# Patient Record
Sex: MALE | Race: WHITE
[De-identification: names, ages, dates, MRNs, and addresses within clinical notes are randomized per-mention and may not be internally consistent; named-entity substitution may affect disease eponyms.]

---

## 2018-11-19 ENCOUNTER — HOSPITAL ENCOUNTER (EMERGENCY)
Dept: HOSPITAL 88 - ER | Age: 38
Discharge: HOME | End: 2018-11-19
Payer: SELF-PAY

## 2018-11-19 VITALS — BODY MASS INDEX: 31.76 KG/M2 | HEIGHT: 64 IN | WEIGHT: 186 LBS

## 2018-11-19 VITALS — DIASTOLIC BLOOD PRESSURE: 97 MMHG | SYSTOLIC BLOOD PRESSURE: 141 MMHG

## 2018-11-19 DIAGNOSIS — E11.9: ICD-10-CM

## 2018-11-19 DIAGNOSIS — R11.2: ICD-10-CM

## 2018-11-19 DIAGNOSIS — F10.20: ICD-10-CM

## 2018-11-19 DIAGNOSIS — R10.13: Primary | ICD-10-CM

## 2018-11-19 DIAGNOSIS — F17.210: ICD-10-CM

## 2018-11-19 DIAGNOSIS — I10: ICD-10-CM

## 2018-11-19 LAB
ALBUMIN SERPL-MCNC: 3.3 G/DL (ref 3.5–5)
ALBUMIN/GLOB SERPL: 0.8 {RATIO} (ref 0.8–2)
ALP SERPL-CCNC: 47 IU/L (ref 40–150)
ALT SERPL-CCNC: 36 IU/L (ref 0–55)
AMYLASE SERPL-CCNC: 52 U/L (ref 25–125)
ANION GAP SERPL CALC-SCNC: 16.8 MMOL/L (ref 8–16)
BACTERIA URNS QL MICRO: (no result) /HPF
BASOPHILS # BLD AUTO: 0 10*3/UL (ref 0–0.1)
BASOPHILS NFR BLD AUTO: 0.5 % (ref 0–1)
BILIRUB UR QL: NEGATIVE
BUN SERPL-MCNC: 13 MG/DL (ref 7–26)
BUN/CREAT SERPL: 15 (ref 6–25)
CALCIUM SERPL-MCNC: 8.7 MG/DL (ref 8.4–10.2)
CHLORIDE SERPL-SCNC: 102 MMOL/L (ref 98–107)
CK MB SERPL-MCNC: 3.7 NG/ML (ref 0–5)
CK MB SERPL-MCNC: 3.9 NG/ML (ref 0–5)
CK SERPL-CCNC: 340 IU/L (ref 30–200)
CK SERPL-CCNC: 451 IU/L (ref 30–200)
CLARITY UR: (no result)
CO2 SERPL-SCNC: 20 MMOL/L (ref 22–29)
COLOR UR: YELLOW
DEPRECATED APTT PLAS QN: 27.5 SECONDS (ref 23.8–35.5)
DEPRECATED INR PLAS: 0.87
DEPRECATED NEUTROPHILS # BLD AUTO: 5.2 10*3/UL (ref 2.1–6.9)
DEPRECATED RBC URNS MANUAL-ACNC: (no result) /HPF (ref 0–5)
EGFRCR SERPLBLD CKD-EPI 2021: > 60 ML/MIN (ref 60–?)
EOSINOPHIL # BLD AUTO: 0 10*3/UL (ref 0–0.4)
EOSINOPHIL NFR BLD AUTO: 0.4 % (ref 0–6)
EPI CELLS URNS QL MICRO: (no result) /LPF
ERYTHROCYTE [DISTWIDTH] IN CORD BLOOD: 12.4 % (ref 11.7–14.4)
GLOBULIN PLAS-MCNC: 4.3 G/DL (ref 2.3–3.5)
GLUCOSE SERPLBLD-MCNC: 69 MG/DL (ref 74–118)
HCT VFR BLD AUTO: 35.8 % (ref 38.2–49.6)
HGB BLD-MCNC: 12.6 G/DL (ref 14–18)
KETONES UR QL STRIP.AUTO: NEGATIVE
LEUKOCYTE ESTERASE UR QL STRIP.AUTO: NEGATIVE
LIPASE SERPL-CCNC: 23 U/L (ref 8–78)
LYMPHOCYTES # BLD: 2.2 10*3/UL (ref 1–3.2)
LYMPHOCYTES NFR BLD AUTO: 27 % (ref 18–39.1)
MAGNESIUM SERPL-MCNC: 2 MG/DL (ref 1.3–2.1)
MCH RBC QN AUTO: 31.2 PG (ref 28–32)
MCHC RBC AUTO-ENTMCNC: 35.2 G/DL (ref 31–35)
MCV RBC AUTO: 88.6 FL (ref 81–99)
MONOCYTES # BLD AUTO: 0.7 10*3/UL (ref 0.2–0.8)
MONOCYTES NFR BLD AUTO: 8.8 % (ref 4.4–11.3)
NEUTS SEG NFR BLD AUTO: 63.1 % (ref 38.7–80)
NITRITE UR QL STRIP.AUTO: NEGATIVE
PLATELET # BLD AUTO: 144 X10E3/UL (ref 140–360)
POTASSIUM SERPL-SCNC: 3.8 MMOL/L (ref 3.5–5.1)
PROT UR QL STRIP.AUTO: (no result)
PROTHROMBIN TIME: 12.6 SECONDS (ref 11.9–14.5)
RBC # BLD AUTO: 4.04 X10E6/UL (ref 4.3–5.7)
SODIUM SERPL-SCNC: 135 MMOL/L (ref 136–145)
SP GR UR STRIP: 1.01 (ref 1.01–1.02)
UROBILINOGEN UR STRIP-MCNC: 0.2 MG/DL (ref 0.2–1)
WBC #/AREA URNS HPF: (no result) /HPF (ref 0–5)

## 2018-11-19 PROCEDURE — 82150 ASSAY OF AMYLASE: CPT

## 2018-11-19 PROCEDURE — 83735 ASSAY OF MAGNESIUM: CPT

## 2018-11-19 PROCEDURE — 83690 ASSAY OF LIPASE: CPT

## 2018-11-19 PROCEDURE — 80053 COMPREHEN METABOLIC PANEL: CPT

## 2018-11-19 PROCEDURE — 85730 THROMBOPLASTIN TIME PARTIAL: CPT

## 2018-11-19 PROCEDURE — 99284 EMERGENCY DEPT VISIT MOD MDM: CPT

## 2018-11-19 PROCEDURE — 84484 ASSAY OF TROPONIN QUANT: CPT

## 2018-11-19 PROCEDURE — 93005 ELECTROCARDIOGRAM TRACING: CPT

## 2018-11-19 PROCEDURE — 82553 CREATINE MB FRACTION: CPT

## 2018-11-19 PROCEDURE — 74177 CT ABD & PELVIS W/CONTRAST: CPT

## 2018-11-19 PROCEDURE — 82550 ASSAY OF CK (CPK): CPT

## 2018-11-19 PROCEDURE — 36415 COLL VENOUS BLD VENIPUNCTURE: CPT

## 2018-11-19 PROCEDURE — 85025 COMPLETE CBC W/AUTO DIFF WBC: CPT

## 2018-11-19 PROCEDURE — 85610 PROTHROMBIN TIME: CPT

## 2018-11-19 PROCEDURE — 81001 URINALYSIS AUTO W/SCOPE: CPT

## 2018-11-19 PROCEDURE — 82948 REAGENT STRIP/BLOOD GLUCOSE: CPT

## 2018-11-19 NOTE — XMS REPORT
Clinical Summary

                             Created on: 2018



Won Tavares

External Reference #: [id number]

: 1980

Sex: Male



Demographics







                          Address                   [address]

[place]  [address]

 

                          Home Phone                [phone]

 

                          Preferred Language        English

 

                          Marital Status            

 

                          Faith Affiliation     Unknown

 

                          Race                      White

 

                          Ethnic Group              Non-





Author







                          Author                    [facility]

 

                          Organization              [facility]

 

                          Address                   Unknown

 

                          Phone                     Unavailable







Support







                Name            Relationship    Address         Phone

 

                Suad Sanchez            Unknown         [phone]







Care Team Providers







                    Care Team Member Name    Role                Phone

 

                    Asked, No Pcp       PCP                 Unavailable







Allergies







                                        Comments



                 Active Allergy     Reactions       Severity        Noted Date 

 

                                        



Childhood allergy



                           Penicillin                2018 







Medications







                          End Date                  Status



              Medication     Sig          Dispensed     Refills      Start  



                                         Date  

 

                                                    Active



                     FLUoxetine (PROzac) 10 MG     Take 10 mg by       0   



                           capsule                   mouth every     



                                         morning.     

 

                                                    Active



                     metoprolol tartrate     Take 50 mg by       0   



                           (LOPRESSOR) 50 mg tablet     mouth 2 (two)     



                                         times a day.     

 

                                                    Active



                     insulin GLARGINE (LANTUS)     Inject 35           0   



                           100 unit/mL injection     Units under     



                           (vial)                    the skin     



                                         nightly.     

 

                                                    Active



                     insulin lispro (HumaLOG)     Inject under        0   



                           100 unit/mL injection     the skin 3     



                                         (three) times     



                                         a day before     



                                         meals.     

 

                                                    Active



                     gabapentin (NEURONTIN)     Take 300 mg         0   



                           300 mg capsule            by mouth 3     



                                         (three) times     



                                         a day.     

 

                                                    Active



                     glimepiride (AMARYL) 4 MG     Take 4 mg by        0   



                           tablet                    mouth daily     



                                         before     



                                         breakfast.     

 

                                                    Active



                     lisinopril          Take 2.5 mg         0   



                           (PRINIVIL,ZESTRIL) 2.5 mg     by mouth     



                           tablet                    daily.     

 

                          2018                Discontinued



                     glimepiride (AMARYL) 2 MG     Take 2 mg by        0   



                           tablet                    mouth daily     



                                         before     



                                         breakfast.     

 

                          2018                Discontinued



                     metoprolol tartrate     Take 25 mg by       0   



                           (LOPRESSOR) 25 mg tablet     mouth 2 (two)     



                                         times a day.     

 

                          2018                Discontinued



                 chlordiazePOXIDE     Take 25 mg by      0               02/15/201  



                     (LIBRIUM) 25 MG capsule     mouth 3             8  



                                         (three) times     



                                         a day as     



                                         needed for     



                                         anxiety or     



                                         withdrawal.     

 

                          2018                Discontinued



                     thiamine 100 MG tablet     Take 100 mg         0   



                                         by mouth     



                                         every     



                                         morning.     

 

                          2018                Discontinued



                     folic acid (FOLVITE) 1 MG     Take 1 mg by        0   



                           tablet                    mouth every     



                                         morning.     

 

                          2018                



              glimepiride (AMARYL) 4 MG     Take 1 tablet     30 tablet     0              



                     tablet              (4 mg total)        8  



                                         by mouth     



                                         daily before     



                                         breakfast for     



                                         30 days.     

 

                          2018                



              atorvastatin (LIPITOR) 40     Take 1 tablet     30 tablet     0              



                     MG tablet           (40 mg total)       8  



                                         by mouth     



                                         nightly for     



                                         30 days.     

 

                          2018                



              metoprolol tartrate     Take 1 tablet     60 tablet     0              



                     (LOPRESSOR) 50 mg tablet     (50 mg total)       8  



                                         by mouth 2     



                                         (two) times a     



                                         day for 30     



                                         days.     

 

                          2018                



              folic acid (FOLVITE) 1 MG     Take 1 tablet     30 tablet     0              



                     tablet              (1 mg total)        8  



                                         by mouth     



                                         every morning     



                                         for 30 days.     

 

                          2018                



              insulin NPH (HumuLIN-N)     Inject 14     10 mL        12             



                     100 unit/mL injection     Units under         8  



                                         the skin 2     



                                         (two) times a     



                                         day for 30     



                                         days.     

 

                          2018                



              pantoprazole (PROTONIX)     Take 1 tablet     30 tablet     0              



                     40 MG EC tablet     (40 mg total)       8  



                                         by mouth     



                                         daily for 30     



                                         days.     

 

                          2018                



              thiamine 100 MG tablet     Take 1 tablet     30 tablet     0              



                           (100 mg                   8  



                                         total) by     



                                         mouth every     



                                         morning for     



                                         30 days.     

 

                          2018                



              pantoprazole (PROTONIX)     Take 1 tablet     30 tablet     0              



                     40 MG EC tablet     (40 mg total)       8  



                                         by mouth     



                                         daily for 30     



                                         days.     

 

                          2018                



              sucralfate (CARAFATE) 1     Take 1 tablet     120 tablet     0              



                     gram tablet         (1 g total)         8  



                                         by mouth 4     



                                         (four) times     



                                         a day for 30     



                                         days.     

 

                          2018                



              ALPRAZolam (XANAX) 0.5 MG     Take 1 tablet     10 tablet     0              



                     tablet              (0.5 mg             8  



                                         total) by     



                                         mouth 2 (two)     



                                         times a day     



                                         as needed for     



                                         anxiety for     



                                         up to 5 days.     

 

                          2018                



              chlordiazePOXIDE     Take 1       12 capsule     0              



                     (LIBRIUM) 5 MG capsule     capsule (5 mg       8  



                                         total) by     



                                         mouth 3     



                                         (three) times     



                                         a day as     



                                         needed for     



                                         anxiety for     



                                         up to 4 days.     

 

                          2018                



              sucralfate (CARAFATE) 100     Take 10 mL (1     1200 mL      0              



                     mg/mL suspension     g total) by         8  



                                         mouth 4     



                                         (four) times     



                                         a day before     



                                         meals and     



                                         nightly for     



                                         30 days.     

 

                          2018                



              folic acid (FOLVITE) 1 MG     Take 1 tablet     30 tablet     0              



                     tablet              (1 mg total)        8  



                                         by mouth     



                                         daily for 30     



                                         days.     

 

                          2018                



              pantoprazole (PROTONIX)     Take 1 tablet     60 tablet     0              



                     40 MG EC tablet     (40 mg total)       8  



                                         by mouth 2     



                                         (two) times a     



                                         day for 30     



                                         days.     

 

                          2018                



              thiamine 100 MG tablet     Take 1 tablet     30 tablet     0              



                           (100 mg                   8  



                                         total) by     



                                         mouth daily     



                                         for 30 days.     







Active Problems







 



                           Problem                   Noted Date

 

 



                           Gastritis                 2018

 

 



                           Esophagitis               2018

 

 



                           Chronic pancreatitis due to acute alcohol intoxication     2018

 

 



                           Epigastric abdominal pain     2018

 

 



                                         Overview:



                                         Added automatically from request for surgery [id number]

 

 



                           Type 2 diabetes mellitus with complication     2018

 

 



                           Thrombocytopenia          2018

 

 



                           B12 deficiency            2018

 

 



                           Anxiety                   2018

 

 



                           ETOH abuse                2018

 

 



                                         Hypertension 







Resolved Problems







  



                     Problem             Noted Date          Resolved Date

 

  



                     Alcohol-induced acute pancreatitis without infection or necrosis     2018







Encounters







                          Care Team                 Description



                     Date                Type                Specialty  

 

                                        



Jessi Coreas MD                 ESOPHAGOGASTRODUODENOSCOPY (EGD) with biopsy



                     2018          Surgery             Gastroenterology  

 

                                        



[facility]                 



                     2018          Anesthesia          Gastroenterology  



                                         [facility], MD Christina Rebolledo, MD Tash Salazar, Gunjan Go MD                       Epigastric abdominal pain (Primary Dx); 

Chronic pancreatitis due to acute alcohol intoxication; 

Intractable vomiting with nausea, unspecified vomiting type; 

Type 2 diabetes mellitus with complication, with long-term current use of 
insulin



                     2018          [facility]            General Internal Medicine  



                           -                         Encounter   



                                         2018    

 

                                        



Gabrielle Shepherd, HENRRY                       



                     2018          Telephone           Diabetes Services  

 

                                        



Rolanda Justin MD Joglekar, Swati, MD Bavare, MD Adiel Salazar David, DO                       Alcohol-induced acute pancreatitis without infection or necrosis

 (Primary Dx); 

Acute alcoholic intoxication with complication



                     2018          [facility]            General Internal Medicine  



                           -                         Encounter   



                                         2018    

 

                                        



Alex Gomez Jr., MD    Alcohol withdrawal syndrome without complication

 (Primary Dx)



                     2018          Emergency           Emergency Medicine  



after 2017



Social History







                                        Date



                 Tobacco Use     Types           Packs/Day       Years Used 

 

                                         



                                         Never Smoker    

 

    



                                         Smokeless Tobacco: Never   



                                         Used   









   



                 Alcohol Use     Drinks/Week     oz/Week         Comments

 

   



                     Yes                 [employer] of          50.4 



                                         beer  









 



                           Sex Assigned at Birth     Date Recorded

 

 



                                         Not on file 









                                        Industry



                           Job Start Date            Occupation 

 

                                        Not on file



                           Not on file               Not on file 









                                        Travel End



                           Travel History            Travel Start 

 





                                         No recent travel history available.







Last Filed Vital Signs







                                        Time Taken



                           Vital Sign                Reading 

 

                                        2018 11:58 AM CDT



                           Blood Pressure            156/95 

 

                                        2018 11:58 AM CDT



                           Pulse                     70 

 

                                        2018  7:38 AM CDT



                           Temperature               36.2 C (97.2 F) 

 

                                        2018 11:58 AM CDT



                           Respiratory Rate          18 

 

                                        2018 11:58 AM CDT



                           Oxygen Saturation         97% 

 

                                        -



                           Inhaled Oxygen            - 



                                         Concentration  

 

                                        2018 10:45 AM CDT



                           Weight                    87.7 kg (193 lb 4 oz) 

 

                                        2018 10:45 AM CDT



                           Height                    165.1 cm (5' 5") 

 

                                        2018 10:45 AM CDT



                           Body Mass Index           32.16 







Plan of Treatment







   



                 Health Maintenance     Due Date        Last Done       Comments

 

   



                           DIABETIC RETINAL EYE EXAM     1980  

 

   



                           MMR VACCINES (1 of  -     12/15/1979  



                                         Standard series)   

 

   



                           DIABETIC FOOT EXAM        12/15/1988  

 

   



                           URINE MICROALBUMIN        12/15/1988  

 

   



                           VARICELLA VACCINES (1 of     12/15/1991  



                                         2 - 2-dose adolescent   



                                         series)   

 

   



                           HEPATITIS B VACCINES (1     12/15/1997  



                                         of 3 - Risk 3-dose   



                                         series)   

 

   



                           INFLUENZA VACCINE         2018  

 

   



                     IPV VACCINES        Aged Out            No longer eligible based



                                         on patient's age to



                                         complete this topic

 

   



                     MENINGOCOCCAL VACCINE     Aged Out            No longer eligible based



                                         on patient's age to



                                         complete this topic







Procedures







                                        Comments



                 Procedure Name     Priority        Date/Time       Associated Diagnosis 

 

                                        



Results for this procedure are in the results section.



                     POC GLUCOSE         Routine             2018  



                                         11:56 AM CDT  

 

                                        



Results for this procedure are in the results section.



                     POC GLUCOSE         Routine             2018  



                                         9:23 AM CDT  

 

                                        



Results for this procedure are in the results section.



                     POC GLUCOSE         Routine             2018  



                                         9:00 AM CDT  

 

                                        



Results for this procedure are in the results section.



                     POC GLUCOSE         Routine             2018  



                                         6:17 AM CDT  

 

                                        



Results for this procedure are in the results section.



                     POC GLUCOSE         Routine             2018  



                                         8:28 PM CDT  

 

                                        



Results for this procedure are in the results section.



                     POC GLUCOSE         Routine             2018  



                                         4:14 PM CDT  

 

                                        



Results for this procedure are in the results section.



                     SURGICAL PATHOLOGY     Routine             2018  



                           REQUEST                   12:57 PM CDT  

 

                                        



Results for this procedure are in the results section.



                     POC GLUCOSE         Routine             2018  



                                         12:24 PM CDT  

 

                                         



                     ESOPHAGOGASTRODUODENOSCOP      2018          Epigastric abdominal pain 



                           Y (EGD)                   11:15 AM CDT  

 

                                        



Results for this procedure are in the results section.



                     POC GLUCOSE         Routine             2018  



                                         6:41 AM CDT  

 

                                        



Results for this procedure are in the results section.



                     HEMOGLOBIN A1C      Routine             2018  



                                         6:30 AM CDT  

 

                                        



Results for this procedure are in the results section.



                     POC GLUCOSE         Routine             2018  



                                         8:14 PM CDT  

 

                                        



Results for this procedure are in the results section.



                     POC GLUCOSE         Routine             2018  



                                         4:24 PM CDT  

 

                                        



Results for this procedure are in the results section.



                     POC GLUCOSE         Routine             2018  



                                         11:01 AM CDT  

 

                                        



Results for this procedure are in the results section.



                     POC GLUCOSE         Routine             2018  



                                         6:19 AM CDT  

 

                                        



Results for this procedure are in the results section.



                     ZZESTIMATED GFR     Routine             2018  



                                         6:15 AM CDT  

 

                                        



Results for this procedure are in the results section.



                     HC COMPLETE BLD COUNT     Routine             2018  



                           W/AUTO DIFF               6:15 AM CDT  

 

                                        



Results for this procedure are in the results section.



                     HEMOGLOBIN A1C      Routine             2018  



                                         6:15 AM CDT  

 

                                        



Results for this procedure are in the results section.



                     LIPID PANEL         Routine             2018  



                                         6:15 AM CDT  

 

                                        



Results for this procedure are in the results section.



                     BASIC METABOLIC PANEL     Routine             2018  



                                         6:15 AM CDT  

 

                                        



Results for this procedure are in the results section.



                     POC GLUCOSE         Routine             2018  



                                         8:00 PM CDT  

 

                                        



Results for this procedure are in the results section.



                     POC GLUCOSE         Routine             2018  



                                         4:20 PM CDT  

 

                                        



Results for this procedure are in the results section.



                     POC GLUCOSE         Routine             2018  



                                         10:09 AM CDT  

 

                                        



Results for this procedure are in the results section.



                     POC GLUCOSE         Routine             2018  



                                         9:45 AM CDT  

 

                                        



Results for this procedure are in the results section.



                     XR CHEST 2 VW       STAT                2018  



                                         7:15 AM CDT  

 

                                        



Results for this procedure are in the results section.



                     CT ABDOMEN PELVIS W     STAT                2018  



                           CONTRAST                  7:05 AM CDT  

 

                                        



Results for this procedure are in the results section.



                     ECG 12-LEAD         STAT                2018  



                                         6:12 AM CDT  

 

                                        



Results for this procedure are in the results section.



                     ZZESTIMATED GFR     STAT                2018  



                                         5:04 AM CDT  

 

                                        



Results for this procedure are in the results section.



                     ALCOHOL LEVEL, BLOOD     STAT                2018  



                                         5:04 AM CDT  

 

                                        



Results for this procedure are in the results section.



                     LIPASE LEVEL        STAT                2018  



                                         5:04 AM CDT  

 

                                        



Results for this procedure are in the results section.



                     COMPREHENSIVE METABOLIC     STAT                2018  



                           PANEL                     5:04 AM CDT  

 

                                        



Results for this procedure are in the results section.



                     HC COMPLETE BLD COUNT     STAT                2018  



                           W/AUTO DIFF               5:04 AM CDT  

 

                                        



Results for this procedure are in the results section.



                     URINE DRUGS OF ABUSE     Routine             2018  



                           SCREEN                    5:02 AM CDT  

 

                                        



Results for this procedure are in the results section.



                     URINALYSIS SCREEN AND     STAT                2018  



                           MICROSCOPY, WITH REFLEX      5:02 AM CDT  



                                         TO CULTURE    

 

                                        



Results for this procedure are in the results section.



                     POC GLUCOSE         Routine             2018  



                                         11:41 AM CDT  

 

                                        



Results for this procedure are in the results section.



                     POC GLUCOSE         Routine             2018  



                                         6:22 AM CDT  

 

                                        



Results for this procedure are in the results section.



                     ZZESTIMATED GFR     Routine             2018  



                                         5:56 AM CDT  

 

                                        



Results for this procedure are in the results section.



                     MAGNESIUM LEVEL     Routine             2018  



                                         5:56 AM CDT  

 

                                        



Results for this procedure are in the results section.



                     HC COMPLETE BLD COUNT     Routine             2018  



                           W/AUTO DIFF               5:56 AM CDT  

 

                                        



Results for this procedure are in the results section.



                     BASIC METABOLIC PANEL     Routine             2018  



                                         5:56 AM CDT  

 

                                        



Results for this procedure are in the results section.



                     POC GLUCOSE         Routine             2018  



                                         8:56 PM CDT  

 

                                        



Results for this procedure are in the results section.



                     POC GLUCOSE         Routine             2018  



                                         4:04 PM CDT  

 

                                        



Results for this procedure are in the results section.



                     POC GLUCOSE         Routine             2018  



                                         12:00 PM CDT  

 

                                        



Results for this procedure are in the results section.



                     POC GLUCOSE         Routine             2018  



                                         6:48 AM CDT  

 

                                        



Results for this procedure are in the results section.



                     LIPASE LEVEL        Routine             2018  



                                         5:34 AM CDT  

 

                                        



Results for this procedure are in the results section.



                     POC GLUCOSE         Routine             2018  



                                         9:23 PM CDT  

 

                                        



Results for this procedure are in the results section.



                     POC GLUCOSE         Routine             2018  



                                         4:15 PM CDT  

 

                                        



Results for this procedure are in the results section.



                     POC GLUCOSE         Routine             2018  



                                         11:22 AM CDT  

 

                                        



Results for this procedure are in the results section.



                     ZZESTIMATED GFR     Routine             2018  



                                         7:04 AM CDT  

 

                                        



Results for this procedure are in the results section.



                     LIPASE LEVEL        Routine             2018  



                                         7:04 AM CDT  

 

                                        



Results for this procedure are in the results section.



                     HC COMPLETE BLD COUNT     Routine             2018  



                           W/AUTO DIFF               7:04 AM CDT  

 

                                        



Results for this procedure are in the results section.



                     BASIC METABOLIC PANEL     Routine             2018  



                                         7:04 AM CDT  

 

                                        



Results for this procedure are in the results section.



                     POC GLUCOSE         Routine             2018  



                                         6:31 AM CDT  

 

                                        



Results for this procedure are in the results section.



                     POC GLUCOSE         Routine             2018  



                                         8:45 PM CDT  

 

                                        



Results for this procedure are in the results section.



                     POC GLUCOSE         Routine             2018  



                                         4:12 PM CDT  

 

                                        



Results for this procedure are in the results section.



                     POC GLUCOSE         Routine             2018  



                                         11:14 AM CDT  

 

                                        



Results for this procedure are in the results section.



                     POC GLUCOSE         Routine             2018  



                                         6:42 AM CDT  

 

                                        



Results for this procedure are in the results section.



                     ZZESTIMATED GFR     Routine             2018  



                                         4:55 AM CDT  

 

                                        



Results for this procedure are in the results section.



                     LIPASE LEVEL        Routine             2018  



                                         4:55 AM CDT  

 

                                        



Results for this procedure are in the results section.



                     LIPID PANEL         Routine             2018  



                                         4:55 AM CDT  

 

                                        



Results for this procedure are in the results section.



                     HEMOGLOBIN A1C      Routine             2018  



                                         4:55 AM CDT  

 

                                        



Results for this procedure are in the results section.



                     COMPREHENSIVE METABOLIC     Routine             2018  



                           PANEL                     4:55 AM CDT  

 

                                        



Results for this procedure are in the results section.



                     HC COMPLETE BLD COUNT     Routine             2018  



                           W/AUTO DIFF               4:55 AM CDT  

 

                                        



Results for this procedure are in the results section.



                     POC GLUCOSE         Routine             2018  



                                         9:01 PM CDT  

 

                                        



Results for this procedure are in the results section.



                     POC GLUCOSE         Routine             2018  



                                         7:46 PM CDT  

 

                                        



Results for this procedure are in the results section.



                     POC GLUCOSE         Routine             2018  



                                         3:40 PM CDT  

 

                                        



Results for this procedure are in the results section.



                     ECHOCARDIOGRAM 2D     Routine             2018  



                           COMPLETE W MMODE SPECTRAL      3:06 PM CDT  



                                         COLOR DOPPLER (82577)    

 

                                        



Results for this procedure are in the results section.



                     CT CHEST WO CONTRAST     STAT                2018  



                                         12:54 PM CDT  

 

                                        



Results for this procedure are in the results section.



                     POC GLUCOSE         Routine             2018  



                                         12:40 PM CDT  

 

                                        



Results for this procedure are in the results section.



                     CT ABDOMEN PELVIS WO     STAT                2018  



                           CONTRAST                  11:37 AM CDT  

 

                                        



Results for this procedure are in the results section.



                     CT HEAD WO CONTRAST     STAT                2018  



                                         11:29 AM CDT  

 

                                        



Results for this procedure are in the results section.



                     URINE DRUGS OF ABUSE     STAT                2018  



                           SCREEN                    6:22 AM CDT  

 

                                        



Results for this procedure are in the results section.



                     URINALYSIS SCREEN AND     STAT                2018  



                           MICROSCOPY, WITH REFLEX      6:22 AM CDT  



                                         TO CULTURE    

 

                                        



Results for this procedure are in the results section.



                     XR CHEST 1 VW PORTABLE     STAT                2018  



                                         5:15 AM CDT  

 

                                        



Results for this procedure are in the results section.



                     ECG 12-LEAD         STAT                2018  



                                         4:52 AM CDT  

 

                                        



Results for this procedure are in the results section.



                     ZZESTIMATED GFR     STAT                2018  



                                         4:48 AM CDT  

 

                                        



Results for this procedure are in the results section.



                     LIPASE LEVEL        STAT                2018  



                                         4:48 AM CDT  

 

                                        



Results for this procedure are in the results section.



                     ALCOHOL LEVEL, BLOOD     STAT                2018  



                                         4:48 AM CDT  

 

                                        



Results for this procedure are in the results section.



                     B NATRIURETIC PEPTIDE     STAT                2018  



                                         4:48 AM CDT  

 

                                        



Results for this procedure are in the results section.



                     TROPONIN            STAT                2018  



                                         4:48 AM CDT  

 

                                        



Results for this procedure are in the results section.



                     COMPREHENSIVE METABOLIC     STAT                2018  



                           PANEL                     4:48 AM CDT  

 

                                        



Results for this procedure are in the results section.



                     PROTHROMBIN TIME WITH INR     STAT                2018  



                                         4:48 AM CDT  

 

                                        



Results for this procedure are in the results section.



                     PARTIAL THROMBOPLASTIN     STAT                2018  



                           TIME (PTT)                4:48 AM CDT  

 

                                        



Results for this procedure are in the results section.



                     HC COMPLETE BLD COUNT     STAT                2018  



                           W/AUTO DIFF               4:48 AM CDT  

 

                                        



Results for this procedure are in the results section.



                     ECG ED PRELIMINARY     Routine             2018  



                           INTERPRETATION            4:46 AM CDT  

 

                                        



Results for this procedure are in the results section.



                     TROPONIN            Timed               2018  



                                         2:59 PM CST  

 

                                        



Results for this procedure are in the results section.



                     CT ABDOMEN PELVIS W     STAT                2018  



                           CONTRAST                  1:00 PM CST  

 

                                        



Results for this procedure are in the results section.



                     ECG ED PRELIMINARY     Routine             2018  



                           INTERPRETATION            11:46 AM CST  

 

                                        



Results for this procedure are in the results section.



                     URINALYSIS SCREEN AND     STAT                2018  



                           MICROSCOPY, WITH REFLEX      11:05 AM CST  



                                         TO CULTURE    

 

                                        



Results for this procedure are in the results section.



                     LACTIC ACID LEVEL     STAT                2018  



                                         10:29 AM CST  

 

                                        



Results for this procedure are in the results section.



                     ALCOHOL LEVEL, BLOOD     STAT                2018  



                                         10:29 AM CST  

 

                                        



Results for this procedure are in the results section.



                     PHOSPHORUS LEVEL     STAT                2018  



                                         10:29 AM CST  

 

                                        



Results for this procedure are in the results section.



                     MAGNESIUM LEVEL     STAT                2018  



                                         10:29 AM CST  

 

                                        



Results for this procedure are in the results section.



                     TROPONIN            STAT                2018  



                                         10:29 AM CST  

 

                                        



Results for this procedure are in the results section.



                     ZZESTIMATED GFR     STAT                2018  



                                         10:29 AM CST  

 

                                        



Results for this procedure are in the results section.



                     HC COMPLETE BLD COUNT     STAT                2018  



                           W/AUTO DIFF               10:29 AM CST  

 

                                        



Results for this procedure are in the results section.



                     LIPASE LEVEL        STAT                2018  



                                         10:29 AM CST  

 

                                        



Results for this procedure are in the results section.



                     COMPREHENSIVE METABOLIC     STAT                2018  



                           PANEL                     10:29 AM CST  

 

                                        



Results for this procedure are in the results section.



                     ECG 12-LEAD         STAT                2018  



                                         10:24 AM CST  



after 2017



Results

* POC glucose (2018 11:56 AM CDT)



Only the most recent of 34 results within the time period is included.





   



                 Component       Value           Ref Range       Performed At

 

   



                 POC glucose     201 (H)         65 - 100 mg/dL     [facility] [place] DEPARTMENT OF



                           Comment:                  PATHOLOGY AND



                           Meter ID: [id number]      GENOMIC MEDICINE



                                         : Paola Liriano  









   



                 Performing Organization     Address         City/[facility]/Zipcode     Phone Number

 

   



                     [facility]     [address] [place], [place] [id number] 



                                         PATHOLOGY AND GENOMIC   



                                         MEDICINE   





* Surgical pathology request (2018 12:57 PM CDT)





   



                 Component       Value           Ref Range       Performed At

 

   



                     Case number         [id number]        [facility] [place] DEPARTMENT OF



                                         PATHOLOGY AND



                                         GENOMIC MEDICINE

 

   



                     Surgical pathology report     See link below for PDF Lab      [facility] [place] DEPARTMENT OF



                           Report                    PATHOLOGY AND



                                         GENOMIC MEDICINE

 

   



                     Result status       This is Final Report to      [facility] [place] DEPARTMENT OF



                           [id number]             PATHOLOGY AND



                                         GENOMIC MEDICINE









   



                 Performing Organization     Address         City/[facility]     Phone Number

 

   



                     [place] [phone] 



                                         PATHOLOGY AND GENOMIC   



                                         MEDICINE   





* Hemoglobin A1c (2018  6:30 AM CDT)



Only the most recent of 3 results within the time period is included.





   



                 Component       Value           Ref Range       Performed At

 

   



                 Hemoglobin A1C     7.9 (H)         4.0 - 6.0 %     [facility] [place] DEPARTMENT OF



                           Comment:                  PATHOLOGY AND



                           ******************************      GENOMIC MEDICINE



                                         ***********************  



                                         Less than 6% -  



                                         Goal of therapy for Type II  



                                         Diabetes  



                                         Less than  



                                         7%-Goal of  



                                         therapy for Type I Diabetes  



                                         Less than  



                                         8%-Accepta  



                                         ble control for Type I or Type

  



                                           



                                         II Diabetes  



                                         Greater than  



                                         8%-Unacceptabl  



                                         e control; action indicated.  



                                           



                                         (ADA94)  













                                         Specimen

 





                                         Blood









   



                 Performing Organization     Address         City/[facility]     Phone Number

 

   



                     [place] [phone] 



                                         PATHOLOGY AND GENOMIC   



                                         MEDICINE   





* Estimated GFR (2018  6:15 AM CDT)



Only the most recent of 7 results within the time period is included.





   



                 Component       Value           Ref Range       Performed At

 

   



                 GFR Non Af Amer     >90             mL/min/1.73 m2     [facility] [place] DEPARTMENT OF



                                         PATHOLOGY AND



                                         GENOMIC MEDICINE

 

   



                 GFR Af Amer     >90             mL/min/1.73 m2     [facility] [place] DEPARTMENT OF



                           Comment:                  PATHOLOGY AND



                           Chronic kidney disease: <60      GENOMIC MEDICINE



                                         mL/min/1.73m2  



                                         Kidney failure: <15  



                                         mL/min/1.73m2  



                                         The estimated GFR is  



                                         calculated from the  



                                         IDMS-traceable Modification of  



                                         Diet  



                                         in Renal Disease Equation. The  



                                         accuracy of the calculation is  



                                         poor when the  



                                         creatinine is normal.  



                                         Calculated values >90  



                                         mL/min/1.73m2 are not  



                                         reported.  



                                         This equation has not been  



                                         validated in children (<18  



                                         years), pregnant  



                                         women, the elderly (>70  



                                         years), or ethnic groups other  



                                         than Caucasians and  



                                          Americans.  













                                         Specimen

 





                                         Plasma specimen









   



                 Performing Organization     Address         City/State/Zipcode     Phone Number

 

   



                     [place] [id number] [place] [address] 



                                         PATHOLOGY AND GENOMIC   



                                         MEDICINE   





* CBC with platelet and differential (2018  6:15 AM CDT)



Only the most recent of 7 results within the time period is included.





   



                 Component       Value           Ref Range       Performed At

 

   



                 WBC             5.2             4.2 - 11.0 k/uL     [facility]



                                         PATHOLOGY AND



                                         GENOMIC MEDICINE

 

   



                 RBC             4.06            4.04 - 5.86 m/uL     [facility]



                                         PATHOLOGY AND



                                         GENOMIC MEDICINE

 

   



                 HGB             12.8 (L)        13.0 - 17.3 g/dL     [facility]



                                         PATHOLOGY AND



                                         GENOMIC MEDICINE

 

   



                 HCT             37.8            34.0 - 45.0 %     [facility]



                                         PATHOLOGY AND



                                         GENOMIC MEDICINE

 

   



                 MCV             93.1            80.0 - 98.0 fL     [facility]



                                         PATHOLOGY AND



                                         GENOMIC MEDICINE

 

   



                 MCH             31.5            27.0 - 34.0 pg     [facility]



                                         PATHOLOGY AND



                                         GENOMIC MEDICINE

 

   



                 MCHC            33.9            31.5 - 36.5 g/dL     [facility]



                                         PATHOLOGY AND



                                         GENOMIC MEDICINE

 

   



                 RDW - SD        44.8            37.0 - 51.0 fL     [facility]



                                         PATHOLOGY AND



                                         GENOMIC MEDICINE

 

   



                 MPV             10.5 (H)        7.4 - 10.4 fL     [facility]



                                         PATHOLOGY AND



                                         GENOMIC MEDICINE

 

   



                 Platelet count     135 (L)         150 - 400 k/uL     [facility] DEPARTMENT OF



                                         PATHOLOGY AND



                                         GENOMIC MEDICINE

 

   



                 Nucleated RBC     0.00            /100 WBC        [facility] DEPARTMENT OF



                                         PATHOLOGY AND



                                         GENOMIC MEDICINE

 

   



                 Neutrophils     59.0            36.0 - 66.0 %     [facility] DEPARTMENT OF



                                         PATHOLOGY AND



                                         GENOMIC MEDICINE

 

   



                 Lymphocytes     25.7            24.0 - 44.0 %     [facility]



                                         PATHOLOGY AND



                                         GENOMIC MEDICINE

 

   



                 Monocytes       11.6 (H)        0.0 - 6.0 %     [facility]



                                         PATHOLOGY AND



                                         GENOMIC MEDICINE

 

   



                 Eosinophils     2.3             0.0 - 6.0 %     [facility]



                                         PATHOLOGY AND



                                         GENOMIC MEDICINE

 

   



                 Basophils       1.0             0.0 - 1.2 %     [facility]



                                         PATHOLOGY AND



                                         GENOMIC MEDICINE

 

   



                 Immature granulocytes     0.4             0.0 - 1.0 %     [facility] DEPARTMENT OF



                                         PATHOLOGY AND



                                         GENOMIC MEDICINE













                                         Specimen

 





                                         Blood









   



                 Performing Organization     Address         City/[facility]     Phone Number

 

   



                     [facility]     [address] [place] [address] 



                                         PATHOLOGY AND GENOMIC   



                                         MEDICINE   





* Lipid panel (2018  6:15 AM CDT)



Only the most recent of 2 results within the time period is included.





   



                 Component       Value           Ref Range       Performed At

 

   



                 Cholesterol     216 (H)         0 - 199 mg/dL     [facility] DEPARTMENT OF



                                         PATHOLOGY AND



                                         GENOMIC MEDICINE

 

   



                 Triglycerides     66              0 - 149 mg/dL     [facility] DEPARTMENT OF



                                         PATHOLOGY AND



                                         GENOMIC MEDICINE

 

   



                 HDL cholesterol     117             40 - 9,999 mg/dL     [facility] DEPARTMENT OF



                                         PATHOLOGY AND



                                         GENOMIC MEDICINE

 

   



                 LDL cholesterol     102 (H)Comment: Result     0 - 99 mg/dL     [facility]



                           obtained by direct LDL      PATHOLOGY AND



                           measurement               GENOMIC MEDICINE

 

   



                     Lipid panel         See below           [facility] DEPARTMENT OF



                     interpretation      Comment:            PATHOLOGY AND



                           Total Cholesterol         GENOMIC MEDICINE



                                         (mg/dL)  



                                          LDL Cholesterol (mg/dL)  



                                         <200  



                                         Desirable  



                                         <100  



                                          Optimal  



                                         200-239Borderline  



                                         [id number]  



                                         29Near or above  



                                         optimal  



                                         >=240High  



                                           



                                           



                                         130-159Borderline-  



                                         high  



                                           



                                           



                                           



                                         160-189High  



                                           



                                           



                                           



                                         >=190Very high  



                                         HDL Cholesterol  



                                         (mg/dL)  



                                          Triglycerides (mg/dL)  



                                         <40Low  



                                           



                                         <150  



                                          Normal  



                                         >=60  



                                         High  



                                           



                                         150-199Borderline-  



                                         high  



                                           



                                           



                                           



                                         200-499High  



                                           



                                           



                                           



                                         >=500Very high

  



                                         Risk Catergories that modify  



                                         LDL goals.  



                                         Risk  



                                         Catergories  



                                         LDL  



                                         goal (mg/dL)  



                                         CHD and CHD risk  



                                         equivalent  



                                         <100  



                                         (10-year risk >20%)  



                                         Multiple (2+) risk  



                                         factors  



                                          <130  



                                         (10-year risk=<20%)  



                                         0-1 risk  



                                         factors  



                                          <160  



                                         (<10-year  



                                         risk)  



                                           



                                         Defining levels of lipids in  



                                         metabolic syndrome  



                                         Triglycerides  



                                           



                                         >=150 mg/dL  



                                         HDL  



                                         Cholesterol  



                                           



                                         Men  



                                           



                                         <40 mg/dL  



                                         Women  



                                           



                                         <50 mg/dL  



                                         Non-HDL cholesterol is a  



                                         second target for therapy in  



                                         persons  



                                         with high triglycerides (>=200  



                                         mg/dL)  



                                           













                                         Specimen

 





                                         Plasma specimen









   



                 Performing Organization     Address         City/[facility]/Zipcode     Phone Number

 

   



                     [place] [address] [place] [address] 



                                         PATHOLOGY AND GENOMIC   



                                         MEDICINE   





* Basic metabolic panel (2018  6:15 AM CDT)



Only the most recent of 3 results within the time period is included.





   



                 Component       Value           Ref Range       Performed At

 

   



                 Sodium          139             135 - 150 mEq/L     [facility] DEPARTMENT OF



                                         PATHOLOGY AND



                                         GENOMIC MEDICINE

 

   



                 Potassium       3.7             3.5 - 5.0 mEq/L     [facility] DEPARTMENT OF



                                         PATHOLOGY AND



                                         GENOMIC MEDICINE

 

   



                 Chloride        102             98 - 112 mEq/L     [facility] DEPARTMENT OF



                                         PATHOLOGY AND



                                         GENOMIC MEDICINE

 

   



                 CO2             29              24 - 31 mmol/L     [facility] DEPARTMENT OF



                                         PATHOLOGY AND



                                         GENOMIC MEDICINE

 

   



                 Anion gap       8@ANIO          7 - 15 mEq/L     [facility] DEPARTMENT OF



                                         PATHOLOGY AND



                                         GENOMIC MEDICINE

 

   



                 BUN             9               7 - 18 mg/dL     [facility] DEPARTMENT OF



                                         PATHOLOGY AND



                                         GENOMIC MEDICINE

 

   



                 Creatinine      0.70            0.70 - 1.20 mg/dL     [facility] DEPARTMENT OF



                                         PATHOLOGY AND



                                         GENOMIC MEDICINE

 

   



                 Glucose         71              65 - 100 mg/dL     [facility] DEPARTMENT OF



                                         PATHOLOGY AND



                                         GENOMIC MEDICINE

 

   



                 Calcium         8.5             8.3 - 10.2 mg/dL     [facility] DEPARTMENT OF



                                         PATHOLOGY AND



                                         GENOMIC MEDICINE













                                         Specimen

 





                                         Plasma specimen









   



                 Performing Organization     Address         City/State/Zipcode     Phone Number

 

   



                     [facility] DEPARTMENT      [address] [place] [address] 



                                         PATHOLOGY AND GENOMIC   



                                         MEDICINE   





* XR Chest 2 Vw (2018  7:15 AM CDT)





 



                           Narrative                 Performed At

 

 



                           EXAMINATION:XR CHEST 2 VW      [facility]



                                         CLINICAL HISTORY:Admission 



                                         COMPARISON:2018 chest 



                                         IMPRESSION: 



                                         No acute abnormality chest 



                                         The lungs are hypoexpanded but clear. 



                                         The heart is not enlarged. 



                                         The bony structures are within normal limits. 



                                         Two-view chest 



                                         [id number] 









                                        Procedure Note

 

                                        



Hm Interface, Radiology Results Incoming - 2018  7:21 AM CDT



EXAMINATION:  XR CHEST 2 VW



CLINICAL HISTORY:  Admission



COMPARISON:  2018 chest



IMPRESSION:



No acute abnormality chest



The lungs are hypoexpanded but clear.



The heart is not enlarged.



The bony structures are within normal limits. 



Two-view chest



[id number]











   



                 Performing Organization     Address         City/[facility]ode     Phone Number

 

   



                      [facility]          [address] [place] [address] 





* CT Abdomen Pelvis W Contrast (2018  7:05 AM CDT)



Only the most recent of 2 results within the time period is included.





 



                           Narrative                 Performed At

 

 



                           EXAMINATION:CT ABDOMEN PELVIS W CONTRAST      [facility]



                                         CLINICAL HISTORY: 37 yearsMale Nauseavomiting 



                                         TECHNIQUE: Multiple axial images of the abdomen and pelvis were obtained 



                                         following intravenous administration of iodinated contrast. Sagittal and coronal

 



                                         computerized reformatted images were also obtained. CT imaging was performed 



                                         with iterative 



                                         reconstruction techniques and/or automated exposure control to reduce radiation

 



                                         dose. 



                                         COMPARISON:2018 



                                         IMPRESSION: 



                                         Abdomen: 



                                         1. The liver is mildly decreased in attenuation. The spleen, pancreas, 



                                         gallbladder and biliary tree, adrenal glands, and kidneys are normal. The lung 





                                         bases are free of acute disease. 



                                         2.There is diffuse thickening of the visualized lower esophagus. Lymph nodes

 



                                         are present in the gastrohepatic and supraceliac region. 



                                         3.The abdominal aorta is normal in caliber. There our tiny nonspecific 



                                         retroperitoneal lymph nodes. There is no evidence of retroperitoneal mass or 



                                         adenopathy. 



                                         4.The appendix is well-visualized and unremarkable. There is no evidence of

 



                                         intestinal obstruction or free intraperitoneal air. 



                                         Pelvis: 



                                         1. The urinary bladder is slightly distended. There is no evidence of pelvic

 



                                         mass, fluid collection, or pelvic lymphadenopathy. 



                                         2.There are no suspicious bony abnormalities. Hypertrophic changes are 



                                         present in the lower thoracic spine. 



                                         SUMMARY: 



                                         1.Severe diffuse thickening lower esophagus with small paraesophageal and 



                                         gastrohepatic lymph nodes. The findings are most likely related to esophagitis.

 



                                         Endoscopy is recommended for further evaluation. 



                                         2.Mild hepatic steatosis. 



                                         [facility]-[id number] 









                                        Procedure Note

 

                                        



 Interface, Radiology Results Incoming - 2018  7:23 AM CDT



EXAMINATION:  CT ABDOMEN PELVIS W CONTRAST



CLINICAL HISTORY: 37 yearsMale Nausea  vomiting



TECHNIQUE: Multiple axial images of the abdomen and pelvis were obtained 
following intravenous administration of iodinated contrast. Sagittal and coronal
computerized reformatted images were also obtained. CT imaging was performed 
with iterative 

reconstruction techniques and/or automated exposure control to reduce radiation 
dose. 



COMPARISON:  2018



IMPRESSION:



Abdomen:

                                        1.   The liver is mildly decreased in attenuation. The spleen, pancreas, gallbladder

 and biliary tree, adrenal glands, and kidneys are normal. The lung bases are 
free of acute disease.

                                        2.  There is diffuse thickening of the visualized lower esophagus. Lymph nodes are

 present in the gastrohepatic and supraceliac region.

                                        3.  The abdominal aorta is normal in caliber. There our tiny nonspecific retroperitoneal

 lymph nodes. There is no evidence of retroperitoneal mass or adenopathy.

                                        4.  The appendix is well-visualized and unremarkable. There is no evidence of intestinal

 obstruction or free intraperitoneal air.



Pelvis:

                                        1.   The urinary bladder is slightly distended. There is no evidence of pelvic mass,

 fluid collection, or pelvic lymphadenopathy.

                                        2.  There are no suspicious bony abnormalities. Hypertrophic changes are present

 in the lower thoracic spine.



SUMMARY:



                                        1.  Severe diffuse thickening lower esophagus with small paraesophageal and gastrohepatic

 lymph nodes. The findings are most likely related to esophagitis. Endoscopy is 
recommended for further evaluation.

                                        2.  Mild hepatic steatosis.











[facility]-[id number]











   



                 Performing Organization     Address         City/[facility]/Zipcode     Phone Number

 

   



                      [facility]          [address] [place] [address] 





* ECG 12 lead (2018  6:12 AM CDT)



Only the most recent of 3 results within the time period is included.





   



                 Component       Value           Ref Range       Performed At

 

   



                     Ventricular rate     84                  [facility]

 

   



                     Atrial rate         84                  [facility]

 

   



                     NE interval         174                 [facility]

 

   



                     QRSD interval       92                  [facility]

 

   



                     QT interval         380                 [facility]

 

   



                     QTC interval        449                 [facility]

 

   



                     P axis 1            49                  [facility]

 

   



                     QRS axis 1          48                  [facility]

 

   



                     T wave axis         58                  [facility]

 

   



                     EKG impression      Normal sinus rhythm-Normal      [facility]



                                         ECG-In automated comparison  



                                         with ECG of 2018  



                                         04:52,-No significant change  



                                         was found-Electronically  



                                         Signed By Frandy Robledo MD ([id number]) on  



                                         2018 7:01:05 PM  









   



                 Performing Organization     Address         City/[facility]ode     Phone Number

 

   



                     [facility]            [address] [place] [address] 





* Lipase level (2018  5:04 AM CDT)



Only the most recent of 6 results within the time period is included.





   



                 Component       Value           Ref Range       Performed At

 

   



                 Lipase          56              13 - 60 U/L     [facility] DEPARTMENT OF



                                         PATHOLOGY AND



                                         GENOMIC MEDICINE













                                         Specimen

 





                                         Plasma specimen









   



                 Performing Organization     Address         City/[facility] Cent[facility]ode     Phone Number

 

   



                     [address] [place] [address] 



                                         PATHOLOGY AND GENOMIC   



                                         MEDICINE   





* Alcohol level, blood (2018  5:04 AM CDT)



Only the most recent of 3 results within the time period is included.





   



                 Component       Value           Ref Range       Performed At

 

   



                 Alcohol         189.3           mg/dL           [facility] DEPARTMENT OF



                           Comment:                  PATHOLOGY AND



                           Normal      GENOMIC MEDICINE



                                         None  



                                         Detected  



                                         Legal Intoxication in  



                                         [place] 80 mg/dL (0.08%)  



                                         Toxic  



                                         Concentration  



                                          200 mg/dL (0.2%)  



                                         Potentially  



                                         Fatal  



                                         350-500 mg/dL (0.35%-0.5%)  

 

   



                 Alcohol percent     0.189           %               [facility] DEPARTMENT OF



                                         PATHOLOGY AND



                                         GENOMIC MEDICINE













                                         Specimen

 





                                         Blood









   



                 Performing Organization     Address         City/[facility]     Phone Number

 

   



                     [address] [place] [address] 



                                         PATHOLOGY AND GENOMIC   



                                         MEDICINE   





* Comprehensive metabolic panel (2018  5:04 AM CDT)



Only the most recent of 4 results within the time period is included.





   



                 Component       Value           Ref Range       Performed At

 

   



                 Sodium          142             135 - 150 mEq/L     [facility] DEPARTMENT OF



                                         PATHOLOGY AND



                                         GENOMIC MEDICINE

 

   



                 Potassium       4.0             3.5 - 5.0 mEq/L     [facility] DEPARTMENT OF



                                         PATHOLOGY AND



                                         GENOMIC MEDICINE

 

   



                 Chloride        106             98 - 112 mEq/L     [facility] DEPARTMENT OF



                                         PATHOLOGY AND



                                         GENOMIC MEDICINE

 

   



                 CO2             25              24 - 31 mmol/L     [facility] DEPARTMENT OF



                                         PATHOLOGY AND



                                         GENOMIC MEDICINE

 

   



                 Anion gap       11@ANIO         7 - 15 mEq/L     [facility] DEPARTMENT OF



                                         PATHOLOGY AND



                                         GENOMIC MEDICINE

 

   



                 BUN             13              7 - 18 mg/dL     [facility] DEPARTMENT OF



                                         PATHOLOGY AND



                                         GENOMIC MEDICINE

 

   



                 Creatinine      0.60 (L)        0.70 - 1.20 mg/dL     [facility] DEPARTMENT OF



                                         PATHOLOGY AND



                                         GENOMIC MEDICINE

 

   



                 Glucose         95              65 - 100 mg/dL     [facility] DEPARTMENT OF



                                         PATHOLOGY AND



                                         GENOMIC MEDICINE

 

   



                 Calcium         8.3             8.3 - 10.2 mg/dL     [facility] DEPARTMENT OF



                                         PATHOLOGY AND



                                         GENOMIC MEDICINE

 

   



                 Protein         7.2             6.3 - 8.3 g/dL     [facility] DEPARTMENT OF



                                         PATHOLOGY AND



                                         GENOMIC MEDICINE

 

   



                 Albumin         3.1 (L)         3.5 - 5.0 g/dL     [facility] DEPARTMENT OF



                                         PATHOLOGY AND



                                         GENOMIC MEDICINE

 

   



                 A/G ratio       0.8             0.7 - 3.8       [facility] DEPARTMENT OF



                                         PATHOLOGY AND



                                         GENOMIC MEDICINE

 

   



                 Alkaline phosphatase     52              0 - 129 U/L     [facility] DEPARTMENT OF



                                         PATHOLOGY AND



                                         GENOMIC MEDICINE

 

   



                 AST             39              10 - 50 U/L     [facility] DEPARTMENT OF



                                         PATHOLOGY AND



                                         GENOMIC MEDICINE

 

   



                 ALT             43              5 - 50 U/L      [facility] DEPARTMENT OF



                                         PATHOLOGY AND



                                         GENOMIC MEDICINE

 

   



                 Total bilirubin     0.3             0.2 - 1.2 mg/dL     [facility] DEPARTMENT OF



                                         PATHOLOGY AND



                                         GENOMIC MEDICINE













                                         Specimen

 





                                         Plasma specimen









   



                 Performing Organization     Address         City/State/Zipcode     Phone Number

 

   



                     [place] [address] [place] [address] 



                                         PATHOLOGY AND GENOMIC   



                                         MEDICINE   





* Urinalysis screen and microscopy, with reflex to culture (2018  5:02 AM 
  CDT)



Only the most recent of 3 results within the time period is included.





   



                 Component       Value           Ref Range       Performed At

 

   



                     Specimen site       Clean catch         [facility] DEPARTMENT OF



                                         PATHOLOGY AND



                                         GENOMIC MEDICINE

 

   



                     Color, UA           Yellow              [facility] DEPARTMENT OF



                                         PATHOLOGY AND



                                         GENOMIC MEDICINE

 

   



                     Appearance, UA      Clear               [facility] DEPARTMENT OF



                                         PATHOLOGY AND



                                         GENOMIC MEDICINE

 

   



                 Specific gravity, UA     1.011           1.001 - 1.035     [facility] DEPARTMENT OF



                                         PATHOLOGY AND



                                         GENOMIC MEDICINE

 

   



                 pH, UA          5.0             5.0 - 8.5       [facility] DEPARTMENT OF



                                         PATHOLOGY AND



                                         GENOMIC MEDICINE

 

   



                 Protein, UA     2+ (A)          Negative        [facility] DEPARTMENT OF



                                         PATHOLOGY AND



                                         GENOMIC MEDICINE

 

   



                 Glucose, UA     Negative        Negative        [facility] DEPARTMENT OF



                                         PATHOLOGY AND



                                         GENOMIC MEDICINE

 

   



                 Ketones, UA     Negative        Negative        [facility] DEPARTMENT OF



                                         PATHOLOGY AND



                                         GENOMIC MEDICINE

 

   



                 Bilirubin, UA     Negative        Negative        [facility] DEPARTMENT OF



                                         PATHOLOGY AND



                                         GENOMIC MEDICINE

 

   



                 Blood, UA       Moderate (A)     Negative        [facility] DEPARTMENT OF



                                         PATHOLOGY AND



                                         GENOMIC MEDICINE

 

   



                 Nitrite, UA     Negative        Negative        [facility] DEPARTMENT OF



                                         PATHOLOGY AND



                                         GENOMIC MEDICINE

 

   



                 Urobilinogen, UA     Negative        <2.0            [facility] DEPARTMENT OF



                                         PATHOLOGY AND



                                         GENOMIC MEDICINE

 

   



                 Leukocyte esterase, UA     Negative        Negative        [facility] DEPARTMENT OF



                                         PATHOLOGY AND



                                         GENOMIC MEDICINE

 

   



                 Epithelial cells, UA     Few             /HPF            [facility] DEPARTMENT OF



                                         PATHOLOGY AND



                                         GENOMIC MEDICINE

 

   



                 WBC, UA         <1              0 - 1 /HPF      [facility] DEPARTMENT OF



                                         PATHOLOGY AND



                                         GENOMIC MEDICINE

 

   



                 RBC, UA         2               0 - 5 /HPF      [facility] DEPARTMENT OF



                                         PATHOLOGY AND



                                         GENOMIC MEDICINE

 

   



                 Bacteria, UA     Trace           None seen       [facility] DEPARTMENT OF



                                         PATHOLOGY AND



                                         GENOMIC MEDICINE

 

   



                     Yeast, UA           None seen           [facility] DEPARTMENT OF



                                         PATHOLOGY AND



                                         GENOMIC MEDICINE

 

   



                     Yeast with pseudohyphae,     None seen           [facility] DEPARTMENT OF



                           UA                        PATHOLOGY AND



                                         GENOMIC MEDICINE

 

   



                 Granular casts, UA     5 (H)           0 - 1 /LPF      [facility] DEPARTMENT OF



                                         PATHOLOGY AND



                                         GENOMIC MEDICINE

 

   



                 Hyaline casts, UA     1               /LPF            [facility] DEPARTMENT OF



                                         PATHOLOGY AND



                                         GENOMIC MEDICINE













                                         Specimen

 





                                         Urine









   



                 Performing Organization     Address         City/State[facility]     Phone Number

 

   



                     [facility]     [address] [place] [address] 



                                         PATHOLOGY AND GENOMIC   



                                         MEDICINE   





* Urine drugs of abuse screen (2018  5:02 AM CDT)



Only the most recent of 2 results within the time period is included.





   



                 Component       Value           Ref Range       Performed At

 

   



                     Amphetamine screen, urine     Negative            [facility] DEPARTMENT OF



                                         PATHOLOGY AND



                                         GENOMIC MEDICINE

 

   



                     Barbiturate screen, urine     Negative            [facility] DEPARTMENT OF



                                         PATHOLOGY AND



                                         GENOMIC MEDICINE

 

   



                     Benzodiazepine screen,     Negative            [facility] DEPARTMENT OF



                           urine                     PATHOLOGY AND



                                         GENOMIC MEDICINE

 

   



                     Cocaine screen, urine     Negative            [facility] DEPARTMENT OF



                                         PATHOLOGY AND



                                         GENOMIC MEDICINE

 

   



                     Methadone screen, urine     Negative            [facility] DEPARTMENT OF



                                         PATHOLOGY AND



                                         GENOMIC MEDICINE

 

   



                     Opiates screen, urine     Negative            [facility] DEPARTMENT OF



                                         PATHOLOGY AND



                                         GENOMIC MEDICINE

 

   



                     Phencyclidine screen,     Negative            [facility] DEPARTMENT OF



                           urine                     PATHOLOGY AND



                                         GENOMIC MEDICINE

 

   



                     Cannabinoid screen, urine     Negative            [facility] DEPARTMENT OF



                           Comment:                  PATHOLOGY AND



                           Drug screen minimum       GENOMIC MEDICINE



                                         concentration of detectability  



                                         Amphetamines  



                                         1000 ng/mL  



                                         Methamphetamines  



                                         1000 ng/mL  



                                         Barbiturates  



                                          300 ng/mL  



                                         Benzodiazepines  



                                         300 ng/mL  



                                         Cocaine  



                                         300 ng/mL  



                                         Methadone  



                                         300 ng/mL  



                                         Opiates  



                                         300 ng/mL  



                                         Phencyclidine  



                                          25 ng/mL  



                                         Cannabinoids  



                                         50 ng/mL  



                                         Tricyclics  



                                         1000 ng/mL  



                                         Negative test results  



                                         indicates presumptive evidence  



                                         of lack  



                                         of clinically significant drug  



                                         concentration in this urine  



                                         specimen.  



                                         Positive test results are  



                                         presumptive evidence of  



                                         clinically  



                                         significant drug concentration  



                                         in this urine specimen.  



                                         Testing performed for medical  



                                         purposes only.  













                                         Specimen

 





                                         Urine









   



                 Performing Organization     Address         City/[facility]/Zipcode     Phone Number

 

   



                     [facility]     [address] [place] [address] 



                                         PATHOLOGY AND [facility]   



                                         MEDICINE   





* Magnesium level (2018  5:56 AM CDT)



Only the most recent of 2 results within the time period is included.





   



                 Component       Value           Ref Range       Performed At

 

   



                 Magnesium       2.10            1.60 - 2.40 mg/dL     [facility] DEPARTMENT OF



                                         PATHOLOGY AND



                                         [facility] MEDICINE













                                         Specimen

 





                                         Plasma specimen









   



                 Performing Organization     Address         City/[facility]     Phone Number

 

   



                     [address] [place] [address] 



                                         PATHOLOGY AND [facility]   



                                         MEDICINE   





* Echocardiogram complete w contrast and 3D if needed (2018  3:06 PM CDT)





   



                 Component       Value           Ref Range       Performed At

 

   



                 Ao Root Diameter     3.15            cm              [facility]

 

   



                 AoV Mean PG     6.80            mmHg            [facility]

 

   



                 AoV Peak PG     12.17           mmHg            [facility]

 

   



                 AoV Vmax        1.74            m/s             [facility]

 

   



                 AoV VTI         0.30            m               [facility]

 

   



                 IVS,d           1.21 (A)        0.6 - 1.2 cm     [facility]

 

   



                 Left Atrium Dimension     2.25            cm              [facility]



                                         Anterior   

 

   



                 LV,d            4.63            cm              [facility]

 

   



                 LV,s            2.82            cm              [facility]

 

   



                 LVOT Diam,S     2.12            cm              [facility]

 

   



                 LVOT Vmax       1.12            m/s             [facility]

 

   



                 LVOT VTI        0.20            m               [facility]

 

   



                 LVPWD,d         1.04            cm              [facility]

 

   



                 PV Pk Grad      7.25            mmHg            [facility]

 

   



                 PV VMAX         1.35            m/s             [facility]

 

   



                 MV E A ratio     0.80            mmHg            [facility]

 

   



                 E wave decelartion time     311.07          msec            [facility]

 

   



                 MV Peak A [employer]     0.98            m/s             [facility]

 

   



                 MV valve area p 1/2     5.11            cm2             [facility]



                                         method   

 

   



                 MV Peak E [employer]     0.78            m/s             [facility]

 

   



                 MV stenosis pressure 1/2     43.07           ms              [facility]



                                         time   

 

   



                 AV LVOT peak gradient     5.03            mmHg            [facility]

 

   



                 Ao Root Diameter     3.15            cm              [facility]

 

   



                 MV mean gradient     1.71            mmHg            [facility]

 

   



                 LV SYS VOL      30.07           ml              [facility]

 

   



                 LV JULES VOL     99.03           ml              [facility]

 

   



                 LV SV Teich 2D     68.96           ml              [facility]

 

   



                 LVOT CO         7.19            l/min           [facility]

 

   



                 LVOT HR for LVOT CO     99.15           bpm             [facility]

 

   



                 MR peak grad     2.82            mmHg            [facility]

 

   



                 MV Vmax         0.84            m               [facility]

 

   



                 MV VTI Tips     0.16            m               [facility]

 

   



                     AoV Vmn             1.25                [facility]

 

   



                     IVS s 2D            1.45                [facility]

 

   



                     LVOT Vmn            0.82                [facility]

 

   



                 LVOT mean grad     2.95            mmHg            [facility]

 

   



                 LVPW s PLAX     1.30            cm              [facility]

 

   



                 MV Decel slope     2.52            m/s2            [facility]

 

   



                 Velocity Ratio (V1/V2)     0.64            m/s             [facility]

 

   



                 EF              69.64           %               [facility]

 

   



                     E/A ratio           0.80                [facility]

 

   



                 LVOT area       3.53            cm2             [facility]









 



                           Narrative                 Performed At

 

 



                           This result has an attachment that is not available.     [facility]



                                          The left ventricle chamber size is normal. 



                                          There is mild left ventricular 



                                          Left Ventricular ejection fraction is 65 - 70%. 



                                          No pericardial effusion 



                                          The mitral valve appears thickened. 



                                          Spectral Doppler shows impaired relaxation pattern of left ventricular 



                                         diastolic filling. 









   



                 Performing Organization     Address         City/State[facility]     Phone Number

 

   



                      [facility]            [address] [place] [address] 





* CT Chest Wo Contrast (2018 12:54 PM CDT)





 



                           Narrative                 Performed At

 

 



                           Study:CT CHEST WO CONTRAST     [facility]



                                         History: R O pulmonary Nodule 



                                         COMPARISON: None. 



                                         TECHNIQUE: Multiple axial CT images of the chest performed Without IV contrast..

 



                                         Coronal and sagittal reconstructions were done. 



                                         CT imaging was performed with iterative reconstruction technique and/or 



                                         automated exposure control to reduce radiation dose. 



                                         FINDINGS: 



                                         LUNGS: There is no focal consolidation, pleural effusion, or pneumothorax. There

 



                                         are no suspicious pulmonary nodules or pulmonary mass. 



                                         AIRWAYS: No central endobronchial or endotracheal lesions are seen. 



                                         MEDIASTINUM: Dictation the ascending thoracic aorta measures up to 3.8 cm. No 



                                         aneurysm. The main pulmonary artery is not dilated. Heart is normal limits in 



                                         size. No significant pericardial effusion is present. No enlarged mediastinal or

 



                                         hilar lymph nodes 



                                         present. A small hiatal hernia is present. 



                                         BONES AND OVERLYING SOFT TISSUES: The visualized bones are without destructive 





                                         lesions. No enlarged axillary lymph nodes present. 



                                         VISUALIZED LOWER NECK AND UPPER ABDOMEN:Unremarkable. 



                                         IMPRESSION: 



                                         No significant pulmonary abnormality. 



                                         [id number] 









                                        Procedure Note

 

                                        



Hm Interface, Radiology Results Incoming - 2018  1:09 PM CDT



Study:CT CHEST WO CONTRAST



History: R O pulmonary Nodule



COMPARISON: None.



TECHNIQUE: Multiple axial CT images of the chest performed Without IV contrast..
Coronal and sagittal reconstructions were done. 



CT imaging was performed with iterative reconstruction technique and/or 
automated exposure control to reduce radiation dose.



FINDINGS:



LUNGS: There is no focal consolidation, pleural effusion, or pneumothorax. There
are no suspicious pulmonary nodules or pulmonary mass.



AIRWAYS: No central endobronchial or endotracheal lesions are seen.



MEDIASTINUM: Dictation the ascending thoracic aorta measures up to 3.8 cm. No 
aneurysm. The main pulmonary artery is not dilated. Heart is normal limits in 
size. No significant pericardial effusion is present. No enlarged mediastinal or
hilar lymph nodes

 present. A small hiatal hernia is present.



BONES AND OVERLYING SOFT TISSUES: The visualized bones are without destructive 
lesions. No enlarged axillary lymph nodes present.



VISUALIZED LOWER NECK AND UPPER ABDOMEN:Unremarkable.



IMPRESSION:



No significant pulmonary abnormality.



[facility]-[id number] 











   



                 Performing Organization     Address         City/State/Zipcode     Phone Number

 

   



                     [employer]          [address] [place] [address] 





* CT Abdomen Pelvis Wo Contrast (2018 11:37 AM CDT)





 



                           Narrative                 Performed At

 

 



                           EXAMINATION:CT ABDOMEN PELVIS WO CONTRAST      [facility]



                                         CLINICAL HISTORY:abd pain 



                                         COMPARISON:None. 



                                         TECHNIQUE: Multiple axial CT images of the Abdomen and pelvis were obtained 



                                         Without IV contrast limiting evaluation . Sagittal and coronal 



                                         reconstructions were done. Radiation dose reduction technique used for this 



                                         study. 



                                         CT imaging was performed with iterative reconstruction technique and/or 



                                         automated exposure control to reduce radiation dose. 



                                         FINDINGS: 



                                         HEPATOBILIARY:The liver is heterogeneously hypodense from fatty change. No 





                                         focal lesions within the limitations of the study. 



                                         GALLBLADDER: Normal. 



                                         SPLEEN:Not enlarged. Punctate granulomas are present. 



                                         PANCREAS:Unremarkable within the limitations of a noncontrast exam. 



                                         ADRENALS:No adrenal nodules. 



                                         KIDNEYS:No stones or hydronephrosis. 



                                         PERITONEUM/RETROPERITONEUM:No free air or fluid. No lymphadenopathy. 



                                         ABDOMINAL AORTA/IVC: No signs of aneurysm. 



                                         GI TRACT:Visualized portions of the bowel demonstrate no distention or wall

 



                                         thickening. The appendix is normal. 



                                         PELVIC ORGANS/BLADDER:Unremarkable. 



                                         BONES AND SOFT TISSUES:No acute abnormality. 



                                         VISUALIZED LOWER CHEST: No acute abnormality. 



                                         IMPRESSION: 



                                         No acute abnormality. 



                                         [facility]-[id number] 









                                        Procedure Note

 

                                        



 Interface, Radiology Results Incoming - 2018 12:03 PM CDT



EXAMINATION:  CT ABDOMEN PELVIS WO CONTRAST



CLINICAL HISTORY:  abd pain



COMPARISON:  None.



TECHNIQUE: Multiple axial CT images of the Abdomen and pelvis were obtained 
Without IV contrast limiting evaluation     . Sagittal and coronal 
reconstructions were done. Radiation dose reduction technique used for this 
study.



CT imaging was performed with iterative reconstruction technique and/or 
automated exposure control to reduce radiation dose.



FINDINGS:



HEPATOBILIARY:  The liver is heterogeneously hypodense from fatty change. No 
focal lesions within the limitations of the study.



GALLBLADDER: Normal.



SPLEEN:  Not enlarged. Punctate granulomas are present.



PANCREAS:  Unremarkable within the limitations of a noncontrast exam.



ADRENALS:  No adrenal nodules.



KIDNEYS:  No stones or hydronephrosis.



PERITONEUM/RETROPERITONEUM:  No free air or fluid. No lymphadenopathy.



ABDOMINAL AORTA/IVC: No signs of aneurysm.



GI TRACT:  Visualized portions of the bowel demonstrate no distention or wall 
thickening. The appendix is normal.



PELVIC ORGANS/BLADDER:  Unremarkable.



BONES AND SOFT TISSUES:  No acute abnormality.



VISUALIZED LOWER CHEST: No acute abnormality.





IMPRESSION:



No acute abnormality.









[facility]-[id number] 











   



                 Performing Organization     Address         City/State/Zipcode     Phone Number

 

   



                     [employer]          [address] [place] [address] 





* CT Head Wo Contrast (2018 11:29 AM CDT)





 



                           Narrative                 Performed At

 

 



                           EXAMINATION: CT HEAD WO CONTRAST      [facility]



                                         CLINICAL HISTORY: CONFUSION DELERIUMALTERED LOCUNEXPLAINED 



                                         COMPARISON:None 



                                         TECHNIQUE: Noncontrast enhanced images of the brain were obtained from the skull

 



                                         base to the vertex. Both soft tissue and bone reconstruction algorithms were 



                                         performed.CT imaging was performed with iterative reconstruction technique 





                                         and/or automated 



                                         exposure control to reduce radiation dose. 



                                         FINDINGS: 



                                         The brain parenchyma has no acute lesion. The gray-white matter differentiation

 



                                         is preserved. No evidence of acute intra or extra-axial hemorrhage, mass, mass 





                                         effect or acute territorial infarction. There is no acute hydrocephalus. Basal 





                                         cisterns are 



                                         patent. 



                                         No evidence of intracranial trauma. 



                                         No acute soft tissue hematoma or laceration. 



                                         Paranasal sinuses shows no acute air-fluid levels. 



                                         Mastoid air cells are clear.No skull fractures or aggressive bony lesions. 





                                         IMPRESSION: 



                                         No acute intracranial abnormality identified. 



                                         [facility]-[id number] 









                                        Procedure Note

 

                                        



 Interface, Radiology Results Incoming - 2018 11:40 AM CDT



EXAMINATION: CT HEAD WO CONTRAST



CLINICAL HISTORY: CONFUSION DELERIUM  ALTERED LOC  UNEXPLAINED



COMPARISON:  None



TECHNIQUE: Noncontrast enhanced images of the brain were obtained from the skull
base to the vertex. Both soft tissue and bone reconstruction algorithms were 
performed.  CT imaging was performed with iterative reconstruction technique 
and/or automated 

exposure control to reduce radiation dose.





FINDINGS:



The brain parenchyma has no acute lesion. The gray-white matter differentiation 
is preserved. No evidence of acute intra or extra-axial hemorrhage, mass, mass 
effect or acute territorial infarction. There is no acute hydrocephalus. Basal 
cisterns are 

patent. 



No evidence of intracranial trauma.



No acute soft tissue hematoma or laceration.



Paranasal sinuses shows no acute air-fluid levels.  

Mastoid air cells are clear.  No skull fractures or aggressive bony lesions. 





IMPRESSION:



No acute intracranial abnormality identified.













[facility]-[id number]











   



                 Performing Organization     Address         City/[facility] Cent[facility]     Phone Number

 

   



                     [employer]          [address] [place] [address] 





* XR Chest 1 Vw Portable (2018  5:15 AM CDT)





 



                           Narrative                 Performed At

 

 



                           EXAMINATION:XR CHEST 1 VW PORTABLE     [employer] [facility]



                                         CLINICAL HISTORY:Chest Pain 



                                         COMPARISON:None 



                                         IMPRESSION: 



                                         Nodular opacity projecting over the left lung base measures 1 cm. This may just

 



                                         represent nipple shadow, and repeat evaluation with nipple markers or CT chest 





                                         could be performed to exclude an underlying nodule. 



                                         No consolidations, effusions, or pneumothorax. 



                                         Cardiomediastinal silhouette is within normal limits. 



                                         No acute osseous abnormalities. 



                                         [facility]-[id number] 









                                        Procedure Note

 

                                        



 Interface, Radiology Results Incoming - 2018  5:43 AM CDT



EXAMINATION:  XR CHEST 1 VW PORTABLE



CLINICAL HISTORY:  Chest Pain



COMPARISON:  None



IMPRESSION:



Nodular opacity projecting over the left lung base measures 1 cm. This may just 
represent nipple shadow, and repeat evaluation with nipple markers or CT chest 
could be performed to exclude an underlying nodule.



No consolidations, effusions, or pneumothorax.



Cardiomediastinal silhouette is within normal limits.



No acute osseous abnormalities.







[facility]-[id number]











   



                 Performing Organization     Address         City/[facility] Centercode     Phone Number

 

   



                     [employer] [facility]          [address] [place] [address] 





* Troponin (2018  4:48 AM CDT)



Only the most recent of 3 results within the time period is included.





   



                 Component       Value           Ref Range       Performed At

 

   



                 Troponin        <0.01           0.00 - 0.60 ng/mL     [facility] [place] [facility] DEPARTMENT OF



                           Comment:                  PATHOLOGY AND



                           0.11 - 1.49               GENOMIC MEDICINE



                                         ng/mlMay  



                                         indicate increased risk of  



                                         acute  



                                           



                                          coronary  



                                         syndrome.  



                                           



                                           



                                         >=1.5  



                                         ng/ml  



                                         Consistent with acute  



                                         myocardial  



                                           



                                          infarction.  



                                           



                                           



                                           



                                           



                                           



                                         The diagnostic value of a  



                                         single normal or  



                                         non-diagnostic  



                                         result is  



                                         questionable.Serial  



                                         samples at 2-6 hour intervals  



                                         are required to rule out acute  



                                         myocardial  



                                         injury.  



                                           



                                           













                                         Specimen

 





                                         Plasma specimen









   



                 Performing Organization     Address         City/[facility] Centercode     Phone Number

 

   



                     [place] [phone] 



                                         PATHOLOGY AND [facility]   



                                         MEDICINE   





* Partial thromboplastin time, activated (2018  4:48 AM CDT)





   



                 Component       Value           Ref Range       Performed At

 

   



                 PTT             23.3            23.0 - 36.0 sec     [facility] DEPARTMENT OF



                           Comment:                  PATHOLOGY AND



                           PTT therapeutic range for      [facility] MEDICINE



                                         unfractionated heparin is  



                                         61.0-112.0 seconds which  



                                         corresponds to Anti-Xa  



                                         0.3-0.7 U/ml.  



                                         Note:Change in Panic Value  



                                         The PTT Panic Value is  



                                         changing from 110 sec. to 100  



                                         sec.  



                                         due to new instrumentation and  



                                         reagents.  



                                         Correlation studies have been  



                                         performed to validate this  



                                         result.  













                                         Specimen

 





                                         Blood









   



                 Performing Organization     Address         City/[place]     Phone Number

 

   



                     [facility] [place]     [address] [place] [address] 



                                         PATHOLOGY AND [facility]   



                                         MEDICINE   





* Prothrombin time with INR (2018  4:48 AM CDT)





   



                 Component       Value           Ref Range       Performed At

 

   



                 Prothrombin time     11.7 (L)        12.0 - 15.0 sec     [facility] [place] [facility] DEPARTMENT OF



                                         PATHOLOGY AND



                                         [facility] MEDICINE

 

   



                 INR             0.85 (L)        0.92 - 1.12     [facility] DEPARTMENT OF



                           Comment:                  PATHOLOGY AND



                           For patients on anticoagulant      GENOMIC MEDICINE



                                         therapy, reference ranges  



                                         below:  



                                         Indication:  



                                           



                                         INR Value  



                                         Treatment of Venous  



                                         Thrombosis,  



                                          2.0-3.0  



                                         pulmonary emboli, or  



                                         prophylaxis  



                                         of a venous thrombosis, or  



                                         systemic  



                                         emboli.  



                                         High dose, high risk  



                                         patients  



                                          3.0-4.5  



                                         with mechanical valves.  



                                         NOTE:INR values over 3.0  



                                         are sometimes associated with  



                                         gastrointestinal hemorrhage,  



                                         especially values over 4.0.  













                                         Specimen

 





                                         Blood









   



                 Performing Organization     Address         City/[facility] Centercode     Phone Number

 

   



                     [facility] DEPARTMENT [address]      [place] [phone] 



                                         PATHOLOGY AND [facility]   



                                         MEDICINE   





* B natriuretic peptide (2018  4:48 AM CDT)





   



                 Component       Value           Ref Range       Performed At

 

   



                 BNP             6               0 - 100 pg/mL     [facility] DEPARTMENT OF



                                         PATHOLOGY AND



                                         GENOMIC MEDICINE













                                         Specimen

 





                                         Blood









   



                 Performing Organization     Address         City/State[place]     Phone Number

 

   



                     [facility] DEPARTMENT OF     [address]      [place] [phone] 



                                         PATHOLOGY AND GENOMIC   



                                         MEDICINE   





* ECG ED Preliminary Interpretation - NOT AN ORDER (2018  4:46 AM CDT)



Only the most recent of 2 results within the time period is included.





 



                           Narrative                 Performed At

 

 



                                         Rolanda Justin MD 2018 10:42 PM 



                                         ECG ED Preliminary Interpretation - Not an Order 



                                         Performed by: ROLANDA JUSTIN 



                                         Authorized by: ROLANDA JUSTIN 



                                         Interpretation: 



                                         Interpretation: normal 



                                         Rate: 



                                         ECG rate:97 



                                         ECG rate assessment: normal 



                                         Rhythm: 



                                         Rhythm: sinus rhythm 



                                         Ectopy: 



                                         Ectopy: none 



                                         QRS: 



                                         QRS axis:Normal 



                                         QRS intervals:Normal 



                                         Conduction: 



                                         Conduction: normal 



                                         ST segments: 



                                         ST segments:Normal 



                                         T waves: 



                                         T waves: normal 



                                         Comments: 



                                          ECG was performed at 0452. 





* Phosphorus level (2018 10:29 AM CST)





   



                 Component       Value           Ref Range       Performed At

 

   



                 Phosphorus      3.4             2.5 - 4.5 mg/dL     [facility] DEPARTMENT OF



                                         PATHOLOGY AND



                                         GENOMIC MEDICINE













                                         Specimen

 





                                         Plasma specimen









   



                 Performing Organization     Address         City[place]     Phone Number

 

   



                     [facility] [place]     [address] [place] [place] [address] 



                                         PATHOLOGY AND [facility]   



                                         MEDICINE   





* Lactic acid level (2018 10:29 AM CST)





   



                 Component       Value           Ref Range       Performed At

 

   



                 Lactic acid     1.6             0.5 - 2.2 mmol/L     [facility] DEPARTMENT OF



                                         PATHOLOGY AND



                                         GENOMIC MEDICINE













                                         Specimen

 





                                         Blood









   



                 Performing Organization     Address         City/State/[facility] Centercode     Phone Number

 

   



                     [facility] [place]     [address] [facility].      [place] [phone] 



                                         PATHOLOGY AND GENOMIC   



                                         MEDICINE   





after 2017



Advance Directives





Patient has advance care planning documents, and code status on file. For more i
nformation, please contact:



Jesus Nolan



[address]



[place] [address]









                          Date Inactivated          Comments



                           Code Status               Date Activated  

 

                          2018  7:45 PM         



                           Full Code                 2018  6:04 AM  









  



                           Code Status decision reached by:     Patient

## 2018-11-19 NOTE — XMS REPORT
Patient Summary Document

                             Created on: 2018



FRED LEBLANC

External Reference #: [id number]

: 1980

Sex: Male



Demographics







                          Address                   .

[place]  [address]

 

                          Home Phone                [phone]

 

                          Preferred Language        Unknown

 

                          Marital Status            Unknown

 

                          Scientologist Affiliation     Unknown

 

                          Race                      Unknown

 

                          Ethnic Group              Unknown





Author







                          [facility]                    [facility]

 

                          [facility]

 

                          Address                   Unknown

 

                          Phone                     Unavailable







Support







                Name            Relationship    Address         Phone

 

                    KRISTIN LEBLANC      PRS                 [address] [place]  [address]                  [phone]

 

                    KRISTIN LEBLANC      PRS                 [address] [place]  [address]                  [phone]

 

                    KRISTIN LEBLANC      PRS                 [address] [place]  [address]                  [phone]







Care Team Providers







                    Care Team Member Name    Role                Phone

 

                          Unavailable               Unavailable







Payers







             Payer Name    Policy Type    Policy Number    Effective Date    Expiration Date







Problems

This patient has no known problems.



Allergies, Adverse Reactions, Alerts







          Allergy Name    Allergy Type    Status    Severity    Reaction(s)    Onset Date    Inactive 

Date                      Treating Clinician        Comments

 

        Penicillins    DA      Active    MO              2018-10-12 00:00:00                     

 

        No Known Allergies    DA      Active    U               2016 00:00:00                     







Medications

This patient has no known medications.



Encounters







             Start Date/Time    End Date/Time    Encounter Type    Admission Type    Attending Clinicians

                    Care Facility       Care Department     Encounter ID

 

        2018 09:35:34            Inpatient                    [facility]     [id number]

 

        2018 00:00:00            Inpatient                    [facility]     [id number]

 

        2018 00:00:00            Inpatient                    [facility]     [id number]

 

        2018 10:52:46            Inpatient                    [facility]     [id number]

 

        2018 00:00:00            Inpatient                    [facility]     [id number]

 

        2018 00:00:00            Inpatient                    [facility]     [id number]

 

        2018 07:52:39            Inpatient                    [facility]     [id number]

 

        2018 00:00:00    2018 00:00:00    Outpatient                    [facility]     [id number]

 

        2018 00:00:00    2018 00:00:00    Outpatient                    [facility]     [id number]

 

        2018 00:00:00    2018 00:00:00    Outpatient                    [facility]     [id number]

 

        2018 07:28:20    2018 07:28:20    Emergency                    [facility]     [id number]

 

        2018 06:38:42    2018 06:38:42    Emergency                    [facility]     [id number]

 

        2018-10-23 14:18:54    2018-10-23 14:18:54    Outpatient                    [facility]     [id number]

 

        2018-10-23 13:23:29    2018-10-23 13:23:29    Outpatient                    [facility]     [id number]

 

        2018-10-15 11:52:04    2018-10-15 11:52:04    Outpatient                    [facility]     [id number]

 

        2018-10-15 11:09:03    2018-10-15 11:09:03    Outpatient                    [facility]     [id number]

 

        2018 00:00:00    2018 00:00:00    Outpatient                    [facility]     [id number]

 

        2018 00:00:00    2018 00:00:00    Outpatient                    [facility]     [id number]

 

        2018 00:00:00    2018 00:00:00    Outpatient                    [facility]     [id number]

 

        2018 00:00:00    2018 00:00:00    Outpatient                    [facility]     [id number]

 

        2018 00:00:00    2018 00:00:00    Outpatient                    [facility]     [id number]

 

        2018 00:00:00    2018 00:00:00    Outpatient                    [facility]     [id number]

 

        2018 00:00:00    2018 00:00:00    Outpatient                    [facility]     [id number]

 

        2018 00:00:00    2018 00:00:00    Outpatient                    [facility]     [id number]

 

        2018 00:00:00    2018 00:00:00    Outpatient                    [facility]     [id number]

 

        2018 00:00:00    2018 00:00:00    Outpatient                    [facility]     [id number]

 

        2018-07-10 00:00:00    2018-07-10 00:00:00    Outpatient                    [facility]     [id number]

 

        2018 00:00:00    2018 00:00:00    Outpatient                    [facility]     [id number]

 

        2018 00:00:00    2018 00:00:00    Outpatient                    [facility]     [id number]

 

        2018 00:00:00    2018 00:00:00    Outpatient                    [facility]     [id number]

 

        2018 00:00:00    2018 00:00:00    Outpatient                    [facility]     [id number]

 

        2018 09:20:19    2018 09:20:19    Outpatient                    [facility]     [id number]

 

        2018 08:42:04    2018 08:42:04    Outpatient                    [facility]     [id number]

 

        2018 00:00:00    2018 00:00:00    Outpatient                    [facility]     [id number]

 

        2018 00:00:00    2018 00:00:00    Outpatient                    [facility]     [id number]

 

        2018 00:00:00    2018 00:00:00    Outpatient                    [facility]     [id number]

 

        2018 22:16:28    2018 22:16:28    Emergency                    [facility]     MED     [id number]

 

        2018 00:00:00    2018 00:00:00    Outpatient                    [facility]     [id number]

 

        2018 00:00:00    2018 00:00:00    Outpatient                    [facility]     [id number]

 

        2018 00:00:00    2018 00:00:00    Outpatient                    [facility]     [id number]

 

        2018 00:00:00    2018 00:00:00    Outpatient                    [facility]     [id number]

 

        2018 00:00:00    2018 00:00:00    Outpatient                    [facility]     [id number]

 

        2018 00:00:00    2018 00:00:00    Outpatient                    [facility]     [id number]

 

        2018 00:00:00    2018 00:00:00    Outpatient                    [facility]     [id number]

 

        2018 00:00:00    2018 00:00:00    Outpatient                    [facility]     [id number]

 

        2018 00:00:00    2018 00:00:00    Outpatient                    [facility]     [id number]

 

        2018 00:00:00    2018 00:00:00    Outpatient                    [facility]     [id number]

 

        2018 08:37:16    2018 08:37:16    Outpatient                    [facility]     [id number]

 

        2018 11:24:26    2018 11:24:26    Outpatient                    [facility]     [id number]

 

        2018 10:03:24    2018 10:03:24    Outpatient                    [facility]     [id number]

 

        2018 09:57:23    2018 09:57:23    Outpatient                    [facility]     [id number]

 

        2018 18:28:45    2018 18:28:45    Emergency                    [facility]     MED     [id number]

 

        2018 13:53:33    2018 13:53:33    Outpatient                    [facility]     [id number]

 

        2018 13:11:56    2018 13:11:56    Outpatient                    [facility]     [id number]

 

        2018 00:00:00    2018 00:00:00    Outpatient                    [facility]     [id number]

 

        2018 00:00:00    2018 00:00:00    Outpatient                    [facility]     [id number]

 

        2018 00:00:00    2018 00:00:00    Outpatient                    [facility]     [id number]

 

        2018 00:00:00    2018 00:00:00    Outpatient                    [facility]     [id number]

 

        2018 00:00:00    2018 00:00:00    Outpatient                    [facility]     [id number]

 

        2018 00:00:00    2018 00:00:00    Outpatient                    [facility]     [id number]

 

        2018 05:54:19    2018 05:54:19    Emergency                    [facility]     [id number]

 

        2018 02:24:43    2018 02:24:43    Emergency                    [facility]     [id number]

 

        2018 00:47:45    2018 00:47:45    Inpatient                    [facility]     MED     [id number]







Results







           Test Description    Test Time    Test Comments    Text Results    Atomic Results    Result

 Comments

 

                CULTURE, URINE    2018 10:09:00                      

 

   

 

                Report Text (test code=Report Text)    [facility] 2018 1334                     

 

                Report Text7 (test code=Report Text7)    NO GROWTH WITHIN 24 HOURS                     

 

                Report Text8 (test code=Report Text8)    PRELIMINARY REPORT                     

 

                          Report Text9 (test code=Report Text9)    *************************************************

                                                     

 

                Report Text10 (test code=Report Text10)    [facility] 2018 1009                     

 

                Report Text11 (test code=Report Text11)    NO GROWTH WITHIN 48 HOURS                     

 

                Report Text12 (test code=Report Text12)    FINAL REPORT                     





CT ABDOMEN/PELVIS [id number] 08:11:[address] [id number] IMAGING REPORTPatient Name:
FRED LEBLANC LDate of Service: 11-41-9627Trz:  37    Sex: M  Order #: [id number]     
Room:   [facility]: 1980     X-Ray Number: [id number] Record Number: 
[id number]     Hospital Number: [id number] Physician: NARENDRA THORNTONOrdering 
Physician: AMAURI BOLAND -CT ABDOMEN AND PELVIS WITHOUT CONTRAST 0752 hours 
2018:CLINICAL HISTORY: Lateral flank pain for 2 daysTECHNIQUE: 
Examination is performed with 4 mm axial sections with coronaland sagittal 
reconstructions. This CT exam was performed using one or moreof the following 
dose reduction techniques: Automated exposure control,adjustment of the MA and 
or KV according to patient size or use ofiterative  reconstruction 
technique.FINDINGS: The unopacified urinary tract is normal.Retrocecal appendix 
is normal.The unenhanced liver, spleen, pancreas and adrenals and gallbladder 
arealso unremarkable.There is focal density in the fundus of the stomach possib
ly ingestedmedication.The gas pattern is unremarkable.Impression: Normal examina
tionElectronically Signed By: David Damon M.D., 2018 8:09 AMLegaparish
y authenticated by CHRISTINA PERES 2018 08:09:03WHOLE BLOOD GLUCOSE
2018 07:52:00* 





                Test Item       Value           Reference Range    Comments

 

                WHOLE BLOOD GLUCOSE (test code=POC GLU)    147 mg/dL       70-99           **Fasting glucose normal

 <100 MG/DL- [employer] recommendation**





[id number] 07:08:00* 





                Test Item       Value           Reference Range    Comments

 

                GLUCOSE (test code=URGLU)    NEGATIVE MG/DL    NEG-100          

 

                BILIRUBN (test code=URBILI)    NEGATIVE        NEGATIVE         

 

                KETONE (test code=URKET)    NEGATIVE MG/DL    NEGATIVE         

 

                BLOOD (test code=URBLD)    SMALL                            

 

                UR PH (test code=URPH)    5.5             5.0-7.5          

 

                PROTEIN (test code=URPRO)    100 MG/DL       NEGATIVE         

 

                NITRITES (test code=URNIT)    NEGATIVE        NEGATIVE         

 

                UROBILINGEN (test code=URURO)    0.2 EU/DL       0.2-1.0          

 

                LEUKOCYT (test code=URLEU)    NEGATIVE        NEGATIVE         

 

                UA COLOR (test code=UA COLOR)    YELLOW          YELLOW           

 

                CLARITY (test code=CLARITY)    CLEAR           CLEAR            

 

                SP GRAV (test code=URSPGRAV)    1.014           1.000-1.025      

 

                UAMICRO (test code=UAMICRO)    YES                              

 

                WBC (test code=URWBC)    1 /HPF          0-5              

 

                RBC (test code=URRBC)    3 /HPF          0-2              

 

                CASTS (test code=CAST)    1 /LPF          0-3              

 

                UR EPI (test code=EPI)    10 /LPF                          

 

                BACTERIA (test code=BACTERIA)    NEGATIVE        NONE

## 2018-11-19 NOTE — DIAGNOSTIC IMAGING REPORT
EXAMINATION: CT of the abdomen and pelvis with contrast.



TECHNIQUE: 

Helical CT images of the abdomen and pelvis were performed from the lung bases

to the lesser trochanters after the intravenous administration of 150 cc of

Isovue 300 and the oral administration of none.  Coronal and sagittal

reformatted images were obtained. Dose modulation, iterative reconstruction,

and/or weight based adjustment of the mA/kV was utilized to reduce the

radiation dose to as low as reasonably achievable. 

COMPARISON:  None.



CLINICAL HISTORY:Abdominal pain

     

DISCUSSION:



ABDOMEN/PELVIS:



LOWER THORAX:Unremarkable.



HEPATOBILIARY: No focal hepatic lesions.  No intra-or extrahepatic biliary

ductal dilation.  The gallbladder is normal.   



SPLEEN: No splenomegaly.



PANCREAS: No focal masses or ductal dilatation. 



ADRENALS: No adrenal nodules.



KIDNEYS/URETERS: No hydronephrosis, stones, or solid mass lesions.



PELVIC ORGANS/BLADDER: The bladder is normal.  



PERITONEUM/RETROPERITONEUM: No free air or fluid.



LYMPH NODES: Enlarged gastrohepatic lymph nodes measuring short axis I.8 cm and

1.5 cm axial image 20



VESSELS: The celiac trunk,superior and inferior mesenteric and bilateral  renal

arteries are patent  The portal, superior mesenteric and splenic veins are

patent.



GI TRACT: No obstruction. Possible esophageal wall thickening distally.

Appendix normal.



BONES AND SOFT TISSUE: No bony destructive lesions.    No soft tissue

abnormalities.  



IMPRESSION: 



No acute CT finding.



Enlarged upper abdominal lymph nodes. Etiology indeterminate.



Probable distal esophageal wall thickening.



Signed by: Dr. Andrew Palisch, M.D. on 11/19/2018 3:46 PM